# Patient Record
Sex: MALE | Race: WHITE | NOT HISPANIC OR LATINO | Employment: FULL TIME | URBAN - METROPOLITAN AREA
[De-identification: names, ages, dates, MRNs, and addresses within clinical notes are randomized per-mention and may not be internally consistent; named-entity substitution may affect disease eponyms.]

---

## 2024-07-22 ENCOUNTER — HOSPITAL ENCOUNTER (EMERGENCY)
Facility: HOSPITAL | Age: 29
Discharge: HOME | End: 2024-07-22
Attending: INTERNAL MEDICINE
Payer: OTHER MISCELLANEOUS

## 2024-07-22 VITALS
WEIGHT: 182.1 LBS | RESPIRATION RATE: 18 BRPM | TEMPERATURE: 98.1 F | BODY MASS INDEX: 28.58 KG/M2 | SYSTOLIC BLOOD PRESSURE: 125 MMHG | HEART RATE: 58 BPM | HEIGHT: 67 IN | OXYGEN SATURATION: 98 % | DIASTOLIC BLOOD PRESSURE: 73 MMHG

## 2024-07-22 DIAGNOSIS — S61.412A LACERATION OF LEFT HAND WITHOUT FOREIGN BODY, INITIAL ENCOUNTER: Primary | ICD-10-CM

## 2024-07-22 PROCEDURE — 12001 RPR S/N/AX/GEN/TRNK 2.5CM/<: CPT | Performed by: PHYSICIAN ASSISTANT

## 2024-07-22 PROCEDURE — 99284 EMERGENCY DEPT VISIT MOD MDM: CPT | Performed by: PHYSICIAN ASSISTANT

## 2024-07-22 PROCEDURE — 99282 EMERGENCY DEPT VISIT SF MDM: CPT

## 2024-07-22 NOTE — Clinical Note
Xiang Valentino was seen and treated in our emergency department on 7/22/2024.                Diagnosis:     Xiang  may return to work on return date.    He may return on this date: 07/23/2024    Patient needs to keep Left hand clean and dry and limited flexion movements       If you have any questions or concerns, please don't hesitate to call.      Dianne Lozano PA-C    ______________________________           _______________          _______________  Hospital Representative                              Date                                Time

## 2024-07-22 NOTE — DISCHARGE INSTRUCTIONS
Use Tylenol every 4 hours or Motrin every 6 hours; you can alternate the 2 medications taking something every 3 hours for pain.   Suture removal in 7- 10 days     Follow-up with your doctor or workman's compensation provider

## 2024-07-22 NOTE — ED PROVIDER NOTES
History  Chief Complaint   Patient presents with    Hand Laceration     Cut L hand on a HVAC unit, unsure last tetanus     Last Tdap: 8/2020 according to old records  No PMH  PSH: appendectomy and hernia repair    Patient presents to emergency department for further evaluation and treatment of laceration to left hand over second MCP joint that he sustained immediately prior to arrival when he was at work fixing an HVAC , pulling apart pipes, slipped and cut hand on the sharp metal.  Patient wrapped and came to emergency department.  No other injuries or complaints.              None       No past medical history on file.    Past Surgical History:   Procedure Laterality Date    APPENDECTOMY      HERNIA REPAIR         No family history on file.  I have reviewed and agree with the history as documented.    E-Cigarette/Vaping    E-Cigarette Use Current Every Day User      E-Cigarette/Vaping Substances     Social History     Tobacco Use    Smoking status: Former     Types: Cigarettes     Passive exposure: Never    Smokeless tobacco: Never   Vaping Use    Vaping status: Every Day   Substance Use Topics    Alcohol use: Not Currently    Drug use: Not Currently       Review of Systems   Eyes:  Negative for visual disturbance.   Gastrointestinal:  Negative for vomiting.   Musculoskeletal:  Positive for myalgias.   Skin:  Positive for wound.   Neurological:  Negative for numbness.   All other systems reviewed and are negative.      Physical Exam  Physical Exam  Vitals and nursing note reviewed.   Constitutional:       General: He is not in acute distress.     Appearance: He is well-developed.   HENT:      Nose: Nose normal.      Mouth/Throat:      Mouth: Mucous membranes are moist.      Pharynx: Oropharynx is clear.   Eyes:      Conjunctiva/sclera: Conjunctivae normal.   Cardiovascular:      Rate and Rhythm: Normal rate.   Pulmonary:      Effort: Pulmonary effort is normal. No respiratory distress.   Musculoskeletal:          "General: Tenderness and signs of injury present. Normal range of motion.      Cervical back: Normal range of motion.      Comments: + 1.5 cm subcutaneous linear diagonal laceration noted to dorsal aspect of left hand over second MCP joint, no deep structure involvement, no tendon injury, no active bleeding, no foreign body, good range of motion, good strength against resistance to flexion extension, distal vascular intact   Skin:     General: Skin is warm and dry.      Findings: No erythema.   Neurological:      Mental Status: He is alert.      Motor: No weakness.   Psychiatric:         Behavior: Behavior normal.         Vital Signs  ED Triage Vitals [07/22/24 1206]   Temperature Pulse Respirations Blood Pressure SpO2   98.1 °F (36.7 °C) 58 18 125/73 98 %      Temp Source Heart Rate Source Patient Position - Orthostatic VS BP Location FiO2 (%)   Oral Monitor Lying Right arm --      Pain Score       6           Vitals:    07/22/24 1206 07/22/24 1215   BP: 125/73 125/73   Pulse: 58    Patient Position - Orthostatic VS: Lying          Visual Acuity      ED Medications  Medications - No data to display    Diagnostic Studies  Results Reviewed       None                   No orders to display              Procedures  Universal Protocol:  Consent: Verbal consent obtained.  Risks and benefits: risks, benefits and alternatives were discussed  Consent given by: patient  Time out: Immediately prior to procedure a \"time out\" was called to verify the correct patient, procedure, equipment, support staff and site/side marked as required.  Patient understanding: patient states understanding of the procedure being performed  Patient identity confirmed: verbally with patient  Laceration repair    Date/Time: 7/22/2024 12:34 PM    Performed by: Dianne Lozano PA-C  Authorized by: Dianne Lozano PA-C  Location: left hand.  Laceration length: 1.5 cm  Foreign bodies: no foreign bodies  Tendon involvement: none  Nerve involvement: " none  Vascular damage: no  Anesthesia: local infiltration    Anesthesia:  Local Anesthetic: lidocaine 1% without epinephrine  Anesthetic total: 2 mL    Sedation:  Patient sedated: no      Wound Dehiscence:  Superficial Wound Dehiscence: simple closure      Procedure Details:  Preparation: Patient was prepped and draped in the usual sterile fashion.  Irrigation solution: saline  Irrigation method: syringe  Amount of cleaning: standard  Skin closure: 5-0 Prolene  Number of sutures: 3  Technique: simple  Approximation: close  Approximation difficulty: simple  Dressing: gauze roll  Patient tolerance: patient tolerated the procedure well with no immediate complications               ED Course                                 SBIRT 20yo+      Flowsheet Row Most Recent Value   Initial Alcohol Screen: US AUDIT-C     1. How often do you have a drink containing alcohol? 0 Filed at: 07/22/2024 1211   2. How many drinks containing alcohol do you have on a typical day you are drinking?  0 Filed at: 07/22/2024 1211   3a. Male UNDER 65: How often do you have five or more drinks on one occasion? 1 Filed at: 07/22/2024 1211   3b. FEMALE Any Age, or MALE 65+: How often do you have 4 or more drinks on one occassion? 0 Filed at: 07/22/2024 1211   Audit-C Score 1 Filed at: 07/22/2024 1211   NELIDA: How many times in the past year have you...    Used an illegal drug or used a prescription medication for non-medical reasons? Never Filed at: 07/22/2024 1211                      Medical Decision Making               Disposition  Final diagnoses:   Laceration of left hand without foreign body, initial encounter     Time reflects when diagnosis was documented in both MDM as applicable and the Disposition within this note       Time User Action Codes Description Comment    7/22/2024 12:35 PM Dianne Lozano Add [S61.412A] Laceration of left hand without foreign body, initial encounter           ED Disposition       ED Disposition   Discharge     Condition   Stable    Date/Time   Mon Jul 22, 2024 1235    Comment   Xiang Chaconkristina discharge to home/self care.                   Follow-up Information       Follow up With Specialties Details Why Contact Info    YOUR PCP or Workman's Compensation Physician   For suture removal 7-10 days             There are no discharge medications for this patient.      No discharge procedures on file.    PDMP Review       None            ED Provider  Electronically Signed by             Dianne Lozano PA-C  07/22/24 7870